# Patient Record
Sex: FEMALE | Race: BLACK OR AFRICAN AMERICAN | Employment: FULL TIME | URBAN - METROPOLITAN AREA
[De-identification: names, ages, dates, MRNs, and addresses within clinical notes are randomized per-mention and may not be internally consistent; named-entity substitution may affect disease eponyms.]

---

## 2017-03-27 PROCEDURE — 99284 EMERGENCY DEPT VISIT MOD MDM: CPT

## 2017-03-28 ENCOUNTER — HOSPITAL ENCOUNTER (EMERGENCY)
Age: 22
Discharge: HOME | End: 2017-03-28
Attending: EMERGENCY MEDICINE
Payer: MEDICAID

## 2017-03-28 VITALS
BODY MASS INDEX: 21.89 KG/M2 | HEIGHT: 65 IN | HEART RATE: 80 BPM | OXYGEN SATURATION: 100 % | TEMPERATURE: 98.6 F | WEIGHT: 131.39 LBS | RESPIRATION RATE: 16 BRPM | DIASTOLIC BLOOD PRESSURE: 61 MMHG | SYSTOLIC BLOOD PRESSURE: 106 MMHG

## 2017-03-28 DIAGNOSIS — R11.2 NAUSEA AND VOMITING, INTRACTABILITY OF VOMITING NOT SPECIFIED, UNSPECIFIED VOMITING TYPE: Primary | ICD-10-CM

## 2017-03-28 LAB
ALBUMIN SERPL BCP-MCNC: 3.2 G/DL (ref 3.5–5)
ALBUMIN/GLOB SERPL: 0.8 {RATIO} (ref 1.1–2.2)
ALP SERPL-CCNC: 102 U/L (ref 45–117)
ALT SERPL-CCNC: 49 U/L (ref 12–78)
ANION GAP BLD CALC-SCNC: 9 MMOL/L (ref 5–15)
APPEARANCE UR: CLEAR
AST SERPL W P-5'-P-CCNC: 33 U/L (ref 15–37)
BACTERIA URNS QL MICRO: ABNORMAL /HPF
BASOPHILS # BLD AUTO: 0 K/UL (ref 0–0.1)
BASOPHILS # BLD: 0 % (ref 0–1)
BILIRUB SERPL-MCNC: 0.5 MG/DL (ref 0.2–1)
BILIRUB UR QL: NEGATIVE
BUN SERPL-MCNC: 8 MG/DL (ref 6–20)
BUN/CREAT SERPL: 16 (ref 12–20)
CALCIUM SERPL-MCNC: 8.8 MG/DL (ref 8.5–10.1)
CHLORIDE SERPL-SCNC: 105 MMOL/L (ref 97–108)
CO2 SERPL-SCNC: 24 MMOL/L (ref 21–32)
COLOR UR: ABNORMAL
CREAT SERPL-MCNC: 0.49 MG/DL (ref 0.55–1.02)
EOSINOPHIL # BLD: 0.1 K/UL (ref 0–0.4)
EOSINOPHIL NFR BLD: 2 % (ref 0–7)
EPITH CASTS URNS QL MICRO: ABNORMAL /LPF
ERYTHROCYTE [DISTWIDTH] IN BLOOD BY AUTOMATED COUNT: 13 % (ref 11.5–14.5)
GLOBULIN SER CALC-MCNC: 4 G/DL (ref 2–4)
GLUCOSE SERPL-MCNC: 70 MG/DL (ref 65–100)
GLUCOSE UR STRIP.AUTO-MCNC: NEGATIVE MG/DL
HCT VFR BLD AUTO: 34.7 % (ref 35–47)
HGB BLD-MCNC: 11.6 G/DL (ref 11.5–16)
HGB UR QL STRIP: NEGATIVE
HYALINE CASTS URNS QL MICRO: ABNORMAL /LPF (ref 0–5)
KETONES UR QL STRIP.AUTO: NEGATIVE MG/DL
LEUKOCYTE ESTERASE UR QL STRIP.AUTO: ABNORMAL
LYMPHOCYTES # BLD AUTO: 30 % (ref 12–49)
LYMPHOCYTES # BLD: 2.2 K/UL (ref 0.8–3.5)
MCH RBC QN AUTO: 29.3 PG (ref 26–34)
MCHC RBC AUTO-ENTMCNC: 33.4 G/DL (ref 30–36.5)
MCV RBC AUTO: 87.6 FL (ref 80–99)
MONOCYTES # BLD: 0.5 K/UL (ref 0–1)
MONOCYTES NFR BLD AUTO: 7 % (ref 5–13)
NEUTS SEG # BLD: 4.5 K/UL (ref 1.8–8)
NEUTS SEG NFR BLD AUTO: 61 % (ref 32–75)
NITRITE UR QL STRIP.AUTO: NEGATIVE
PH UR STRIP: 6 [PH] (ref 5–8)
PLATELET # BLD AUTO: 112 K/UL (ref 150–400)
POTASSIUM SERPL-SCNC: 4 MMOL/L (ref 3.5–5.1)
PROT SERPL-MCNC: 7.2 G/DL (ref 6.4–8.2)
PROT UR STRIP-MCNC: NEGATIVE MG/DL
RBC # BLD AUTO: 3.96 M/UL (ref 3.8–5.2)
RBC #/AREA URNS HPF: ABNORMAL /HPF (ref 0–5)
SODIUM SERPL-SCNC: 138 MMOL/L (ref 136–145)
SP GR UR REFRACTOMETRY: 1.03 (ref 1–1.03)
UA: UC IF INDICATED,UAUC: ABNORMAL
UROBILINOGEN UR QL STRIP.AUTO: 1 EU/DL (ref 0.2–1)
WBC # BLD AUTO: 7.3 K/UL (ref 3.6–11)
WBC URNS QL MICRO: ABNORMAL /HPF (ref 0–4)

## 2017-03-28 PROCEDURE — 85025 COMPLETE CBC W/AUTO DIFF WBC: CPT | Performed by: EMERGENCY MEDICINE

## 2017-03-28 PROCEDURE — 80053 COMPREHEN METABOLIC PANEL: CPT | Performed by: EMERGENCY MEDICINE

## 2017-03-28 PROCEDURE — 81001 URINALYSIS AUTO W/SCOPE: CPT | Performed by: EMERGENCY MEDICINE

## 2017-03-28 PROCEDURE — 87086 URINE CULTURE/COLONY COUNT: CPT | Performed by: EMERGENCY MEDICINE

## 2017-03-28 PROCEDURE — 36415 COLL VENOUS BLD VENIPUNCTURE: CPT | Performed by: EMERGENCY MEDICINE

## 2017-03-28 RX ORDER — SODIUM CHLORIDE 0.9 % (FLUSH) 0.9 %
5-10 SYRINGE (ML) INJECTION EVERY 8 HOURS
Status: DISCONTINUED | OUTPATIENT
Start: 2017-03-28 | End: 2017-03-28 | Stop reason: HOSPADM

## 2017-03-28 RX ORDER — ONDANSETRON 4 MG/1
4 TABLET, ORALLY DISINTEGRATING ORAL
Qty: 10 TAB | Refills: 0 | Status: SHIPPED | OUTPATIENT
Start: 2017-03-28 | End: 2018-10-12

## 2017-03-28 RX ORDER — SODIUM CHLORIDE 0.9 % (FLUSH) 0.9 %
5-10 SYRINGE (ML) INJECTION AS NEEDED
Status: DISCONTINUED | OUTPATIENT
Start: 2017-03-28 | End: 2017-03-28 | Stop reason: HOSPADM

## 2017-03-28 RX ORDER — ONDANSETRON 2 MG/ML
4 INJECTION INTRAMUSCULAR; INTRAVENOUS
Status: DISCONTINUED | OUTPATIENT
Start: 2017-03-28 | End: 2017-03-28 | Stop reason: HOSPADM

## 2017-03-28 NOTE — ED NOTES
Assumed care of pt from triage at this time. Pt arrives with c/o N/V/D x earlier yesterday am.  Pt states she is 23 weeks pregnant and initially attributed the N/V to motion sickness, however when the diarrhea began she thought it could be something else. Pt denies any fevers, body aches, chills. Pt denies any abnormal vaginal bleeding, blood in urine or stool. Pt resting comfortably on the stretcher in a position of comfort.  Pt in no acute distress at this time.  Call bell within reach.  Side rails x 2.  Cardiac monitor x 2.  Stretcher locked in the lowest position.  Pt aware of plan to await for MD/PA-C/NP assessment, and pt/family verbalizes understanding.  Will continue to monitor N/V/D.    Suellen Rinne

## 2017-03-28 NOTE — DISCHARGE INSTRUCTIONS
Managing Morning Sickness: Care Instructions  Your Care Instructions  For many women, the toughest part of early pregnancy is morning sickness. Morning sickness can range from mild nausea to severe nausea with bouts of vomiting. Symptoms may be worse in the morning, although they can strike at any time of the day or night. If you have nausea, vomiting, or both, look for safe measures that can bring you relief. You can take simple steps at home to manage morning sickness. These steps include changing what and when you eat and avoiding certain foods and smells. Some women find that acupuncture and acupressure wristbands also help. Follow-up care is a key part of your treatment and safety. Be sure to make and go to all appointments, and call your doctor if you are having problems. It's also a good idea to know your test results and keep a list of the medicines you take. How can you care for yourself at home? · Keep food in your stomach, but not too much at once. Your nausea may be worse if your stomach is empty. Eat five or six small meals a day instead of three large meals. · For morning nausea, eat a small snack, such as a couple of crackers or dry biscuits, before rising. Allow a few minutes for your stomach to settle before you get out of bed slowly. · Drink plenty of fluids, enough so that your urine is light yellow or clear like water. If you have kidney, heart, or liver disease and have to limit fluids, talk with your doctor before you increase the amount of fluids you drink. Some women find that peppermint tea helps with nausea. · Eat more protein, such as chicken, fish, lean meat, beans, nuts, and seeds. · Eat carbohydrate foods, such as potatoes, whole-grain cereals, rice, and pasta. · Avoid smells and foods that make you feel nauseated. Spicy or high-fat foods, citrus juice, milk, coffee, and tea with caffeine often make nausea worse. · Do not drink alcohol. · Do not smoke.  Try not to be around others who smoke. If you need help quitting, talk to your doctor about stop-smoking programs and medicines. These can increase your chances of quitting for good. · If you are taking iron supplements, ask your doctor if they are necessary. Iron can make nausea worse. · Get lots of rest. Stress and fatigue can make your morning sickness worse. · Ask your doctor about taking prescription medicine, or over-the-counter products such as vitamin B6, doxylamine, or raymundo, to relieve your symptoms. Your doctor can tell you the doses that are safe for you. · Take your prenatal vitamins at night on a full stomach. When should you call for help? Call your doctor now or seek immediate medical care if:  · You are too sick to your stomach to drink any fluids. · You have symptoms of dehydration, such as:  ¨ Dry eyes and a dry mouth. ¨ Passing only a little dark urine. ¨ Feeling thirstier than usual.  · You have new symptoms such as diarrhea, fever, or belly pain. Watch closely for changes in your health, and be sure to contact your doctor if:  · You lose weight. · You have ongoing nausea and vomiting. Where can you learn more? Go to http://cheryle-azael.info/. Enter L410 in the search box to learn more about \"Managing Morning Sickness: Care Instructions. \"  Current as of: June 8, 2016  Content Version: 11.1  © 7877-9754 [employer]. Care instructions adapted under license by [employer] (which disclaims liability or warranty for this information). If you have questions about a medical condition or this instruction, always ask your healthcare professional. [place] [address] any warranty or liability for your use of this information.

## 2017-03-28 NOTE — ED PROVIDER NOTES
HPI Comments: Jesse Cintron is a 24 y.o. female, G1 ~23 weeks gravid, who presents ambulatory to the ED c/o multiple episodes of nausea, vomiting (x10), and diarrhea (x3) with associated diffuse abd cramping x this morning. Pt denies any complications with her current pregnancy. She notes her mother is currently sick with similar sxs. Pt specifically denies any recent young abd pain, hematemesis, hematochezia, urinary sxs, vaginal bleeding, vaginal pain, vaginal discharge, CP, SOB, fever, or chills. PCP: Isaura Díaz MD  OB: Florence Galvan MD    Allergies: NKDA  PMHx: Significant for inguinal / umbilical hernia  PSHx: Significant for hernia repair  Social Hx: -tobacco, -EtOH, -Illicit Drugs    There are no other complaints, changes, or physical findings at this time. The history is provided by the patient. Past Medical History:   Diagnosis Date    HX OTHER MEDICAL     inguinal and umbilical hernia repair       History reviewed. No pertinent surgical history. History reviewed. No pertinent family history. Social History     Social History    Marital status: SINGLE     Spouse name: N/A    Number of children: N/A    Years of education: N/A     Occupational History    Not on file. Social History Main Topics    Smoking status: Never Smoker    Smokeless tobacco: Never Used    Alcohol use No    Drug use: No    Sexual activity: Not on file     Other Topics Concern    Not on file     Social History Narrative         ALLERGIES: Review of patient's allergies indicates no known allergies. Review of Systems   Constitutional: Negative. Negative for chills and fever. Eyes: Negative. Respiratory: Negative. Negative for shortness of breath. Cardiovascular: Negative for chest pain. Gastrointestinal: Positive for abdominal pain, diarrhea, nausea and vomiting. Endocrine: Negative. Genitourinary: Negative.   Negative for difficulty urinating, dysuria, frequency, hematuria, vaginal bleeding, vaginal discharge and vaginal pain. Musculoskeletal: Negative. Skin: Negative. Allergic/Immunologic: Negative. Neurological: Negative. Psychiatric/Behavioral: Negative for suicidal ideas. All other systems reviewed and are negative. Vitals:    03/28/17 0130 03/28/17 0230 03/28/17 0245 03/28/17 0300   BP: 105/62 109/62 107/58 106/61   Pulse:       Resp:       Temp:       SpO2: 100% 100% 100% 100%   Weight:       Height:                Physical Exam   Nursing note and vitals reviewed.   General appearance - well nourished, well appearing, and in no distress  Eyes - pupils equal and reactive, extraocular eye movements intact  ENT - mucous membranes moist, pharynx normal without lesions  Neck - supple, no significant adenopathy; non-tender to palpation  Chest - clear to auscultation, no wheezes, rales or rhonchi; non-tender to palpation  Heart - normal rate and regular rhythm, S1 and S2 normal, no murmurs noted  Abdomen - soft, nontender, gravid uterus with fundus palpable at the umbilicus, no masses or organomegaly  Musculoskeletal - no joint tenderness, deformity or swelling; normal ROM  Extremities - peripheral pulses normal, no pedal edema  Skin - normal coloration and turgor, no rashes  Neurological - alert, oriented x3, normal speech, no focal findings or movement disorder noted  Written by ROB Basilio, as dictated by Segundo Morris MD    MDM  Number of Diagnoses or Management Options  Nausea and vomiting, intractability of vomiting not specified, unspecified vomiting type:   Diagnosis management comments:   DDx: hyperemesis gravidarum, dehydration, electrolyte abnormality, UTI       Amount and/or Complexity of Data Reviewed  Clinical lab tests: reviewed and ordered  Review and summarize past medical records: yes    Patient Progress  Patient progress: stable        Procedures    2:42 AM  Progress note:  Pt noted to be feeling better, ready for discharge. Pt passed PO challenge without difficulty; one episode of emesis following antiemetics, nausea resolved at this time. Discussed lab findings with pt and/or family. Will follow up as instructed. All questions have been answered, pt voiced understanding and agreement with plan. Specific return precautions provided as well as instructions to return to the ED should sx worsen at any time. Written by Marilu Neely ED Scribe, as dictated by Nan Feldman MD    LABORATORY TESTS:  Recent Results (from the past 12 hour(s))   CBC WITH AUTOMATED DIFF    Collection Time: 03/28/17 12:39 AM   Result Value Ref Range    WBC 7.3 3.6 - 11.0 K/uL    RBC 3.96 3.80 - 5.20 M/uL    HGB 11.6 11.5 - 16.0 g/dL    HCT 34.7 (L) 35.0 - 47.0 %    MCV 87.6 80.0 - 99.0 FL    MCH 29.3 26.0 - 34.0 PG    MCHC 33.4 30.0 - 36.5 g/dL    RDW 13.0 11.5 - 14.5 %    PLATELET 715 (L) 000 - 400 K/uL    NEUTROPHILS 61 32 - 75 %    LYMPHOCYTES 30 12 - 49 %    MONOCYTES 7 5 - 13 %    EOSINOPHILS 2 0 - 7 %    BASOPHILS 0 0 - 1 %    ABS. NEUTROPHILS 4.5 1.8 - 8.0 K/UL    ABS. LYMPHOCYTES 2.2 0.8 - 3.5 K/UL    ABS. MONOCYTES 0.5 0.0 - 1.0 K/UL    ABS. EOSINOPHILS 0.1 0.0 - 0.4 K/UL    ABS. BASOPHILS 0.0 0.0 - 0.1 K/UL   METABOLIC PANEL, COMPREHENSIVE    Collection Time: 03/28/17 12:39 AM   Result Value Ref Range    Sodium 138 136 - 145 mmol/L    Potassium 4.0 3.5 - 5.1 mmol/L    Chloride 105 97 - 108 mmol/L    CO2 24 21 - 32 mmol/L    Anion gap 9 5 - 15 mmol/L    Glucose 70 65 - 100 mg/dL    BUN 8 6 - 20 MG/DL    Creatinine 0.49 (L) 0.55 - 1.02 MG/DL    BUN/Creatinine ratio 16 12 - 20      GFR est AA >60 >60 ml/min/1.73m2    GFR est non-AA >60 >60 ml/min/1.73m2    Calcium 8.8 8.5 - 10.1 MG/DL    Bilirubin, total 0.5 0.2 - 1.0 MG/DL    ALT (SGPT) 49 12 - 78 U/L    AST (SGOT) 33 15 - 37 U/L    Alk.  phosphatase 102 45 - 117 U/L    Protein, total 7.2 6.4 - 8.2 g/dL    Albumin 3.2 (L) 3.5 - 5.0 g/dL    Globulin 4.0 2.0 - 4.0 g/dL    A-G Ratio 0.8 (L) 1.1 - 2.2     URINALYSIS W/ REFLEX CULTURE    Collection Time: 03/28/17 12:39 AM   Result Value Ref Range    Color DARK YELLOW      Appearance CLEAR CLEAR      Specific gravity 1.027 1.003 - 1.030      pH (UA) 6.0 5.0 - 8.0      Protein NEGATIVE  NEG mg/dL    Glucose NEGATIVE  NEG mg/dL    Ketone NEGATIVE  NEG mg/dL    Bilirubin NEGATIVE  NEG      Blood NEGATIVE  NEG      Urobilinogen 1.0 0.2 - 1.0 EU/dL    Nitrites NEGATIVE  NEG      Leukocyte Esterase MODERATE (A) NEG      WBC 5-10 0 - 4 /hpf    RBC 0-5 0 - 5 /hpf    Epithelial cells MODERATE (A) FEW /lpf    Bacteria 1+ (A) NEG /hpf    UA:UC IF INDICATED URINE CULTURE ORDERED (A) CNI      Hyaline cast 2-5 0 - 5 /lpf       MEDICATIONS GIVEN:  Medications   sodium chloride (NS) flush 5-10 mL (not administered)   sodium chloride (NS) flush 5-10 mL (not administered)   sodium chloride 0.9 % bolus infusion 1,000 mL (not administered)   ondansetron (ZOFRAN) injection 4 mg (not administered)       IMPRESSION:  1. Nausea and vomiting, intractability of vomiting not specified, unspecified vomiting type        PLAN:  1. There are no discharge medications for this patient. 2.   Follow-up Information     Follow up With Details Comments [address] Lazaro Madrigal MD In 2 days  [address] [address]  [phone]      [facility] EMERGENCY DEPT  If symptoms worsen [address] [place] [address]  [phone]        Return to ED if worse     DISCHARGE NOTE:  2:52 AM  The patient's results have been reviewed with family and/or caregiver. They verbally convey their understanding and agreement of the patient's signs, symptoms, diagnosis, treatment, and prognosis. They additionally agree to follow up as recommended in the discharge instructions or to return to the Emergency Room should the patient's condition change prior to their follow-up appointment.  The family and/or caregiver verbally agrees with the care-plan and all of their questions have been answered. The discharge instructions have also been provided to the them along with educational information regarding the patient's diagnosis and a list of reasons why the patient would want to return to the ER prior to their follow-up appointment should their condition change. Written by Edna Estrada ED Scribe, as dictated by Mario House MD.          This note is prepared by Edna Estrada, acting as Scribe for MD Nan Howard MD : The scribe's documentation has been prepared under my direction and personally reviewed by me in its entirety. I confirm that the note above accurately reflects all work, treatment, procedures, and medical decision making performed by me. This note will not be viewable in [address]

## 2017-03-28 NOTE — ED NOTES
Pt reports relief of nausea after medication at this time. Pt resting comfortably with no further needs expressed.

## 2017-03-28 NOTE — LETTER
[address] 
[facility] EMERGENCY DEPT 
[address] [place] [address] [address] [id number] [phone] Work/School Note Date: 3/27/2017 To Whom It May concern: 
 
Aleksey Borges was seen and treated today in the emergency room by the following provider(s): 
Attending Provider: Adán Loco MD. Aleksey Borges may return to school on 3/29/2017. Sincerely, Rolan Leslie RN

## 2017-03-28 NOTE — ED NOTES
Discharge instructions given to patient by Dr. Bean Jerome. Verbalized understanding of instructions. Patient discharged without difficulty. Patient discharged in stable condition via ambulation, pt declined wheelchair, accompanied by self.

## 2017-03-29 LAB
BACTERIA SPEC CULT: NORMAL
CC UR VC: NORMAL
SERVICE CMNT-IMP: NORMAL

## 2018-10-12 ENCOUNTER — OFFICE VISIT (OUTPATIENT)
Dept: FAMILY MEDICINE CLINIC | Age: 23
End: 2018-10-12

## 2018-10-12 VITALS
DIASTOLIC BLOOD PRESSURE: 70 MMHG | HEART RATE: 71 BPM | RESPIRATION RATE: 14 BRPM | TEMPERATURE: 98.5 F | SYSTOLIC BLOOD PRESSURE: 108 MMHG | BODY MASS INDEX: 19.46 KG/M2 | HEIGHT: 64 IN | OXYGEN SATURATION: 100 % | WEIGHT: 114 LBS

## 2018-10-12 DIAGNOSIS — H53.9 VISION CHANGES: Primary | ICD-10-CM

## 2018-10-12 LAB — HBA1C MFR BLD HPLC: 5 %

## 2018-10-12 RX ORDER — MEDROXYPROGESTERONE ACETATE 150 MG/ML
INJECTION, SUSPENSION INTRAMUSCULAR
COMMUNITY
Start: 2018-09-28

## 2018-10-12 NOTE — PROGRESS NOTES
. 
Chief Complaint Patient presents with  Blood sugar problem  
  change in vision concern prompted her to feel she may have a sugar problem 1. Have you been to the ER, urgent care clinic since your last visit? Hospitalized since your last visit? no 
 
2. Have you seen or consulted any other health care providers outside of the [address] [place] [address] since your last visit? Include any pap smears or colon screening. no 
 
Rosa M Park

## 2018-10-12 NOTE — PROGRESS NOTES
HPI 
Kelly Lovell is a 21 y.o. female who presents with a specific concern. She is a new patient. She went to her eye doctor for a routine checkup and had a significant change in the prescription for her right eye. It was a change for the better which concerned the optometrist for a possible change in eye shape i.e. due to diabetes. Patient has been concerned about this, and would like to get checked. She had a pregnancy in 8094 that was uncomplicated. No gestational diabetes, no hypertension She is getting regular follow-up with her gynecologist.  Annie Hunting Depo-Provera. This does not correlate with the vision change. She last saw her pediatrician age 25, Dr. Anay Ayers. We will recheck out for records and immunization PMHx: 
Past Medical History:  
Diagnosis Date  Anemia  HX OTHER MEDICAL   
 inguinal and umbilical hernia repair Meds:  
Current Outpatient Prescriptions Medication Sig Dispense Refill  medroxyPROGESTERone (DEPO-PROVERA) 150 mg/mL syrg  ondansetron (ZOFRAN ODT) 4 mg disintegrating tablet Take 1 Tab by mouth every eight (8) hours as needed for Nausea. 10 Tab 0 Allergies:  
No Known Allergies Smoker: 
History Smoking Status  Never Smoker Smokeless Tobacco  
 Never Used ETOH:  
History Alcohol Use No  
 
 
FH:  
Family History Problem Relation Age of Onset  Diabetes Maternal Grandmother ROS:  
As listed in HPI. In addition: 
Constitutional:   No headache, fever, fatigue, weight loss or weight gain Cardiac:    No chest pain Resp:   No cough or shortness of breath Neuro   No loss of consciousness, dizziness, seizures Physical Exam: 
Blood pressure 108/70, pulse 71, temperature 98.5 °F (36.9 °C), resp. rate 14, height 5' 4\" (1.626 m), weight 114 lb (51.7 kg), last menstrual period 09/28/2018, SpO2 100 %, unknown if currently breastfeeding. GEN: No apparent distress. Alert and oriented and responds to all questions appropriately. NEUROLOGIC:  No focal neurologic deficits. Strength and sensation grossly intact. Coordination and gait grossly intact. EXT: Well perfused. No edema. SKIN: No obvious rashes. Assessment and Plan Concerned about vision change Her vision has improved in her right eye raising the concern of a change in eyes shape which brought her here to discuss the possibility diabetes A1c is 5.0.  Reassured patient. Consider technique problem in visual acuity assessment the last time she had a checkup. She plans to get her prescription changed with her eye doctor. Follow-up regularly with your gynecologist 
 
She is not interested in other blood work today She is not interested in the flu shot today ICD-10-CM ICD-9-CM 1. Vision changes H53.9 368.9 AMB POC HEMOGLOBIN A1C AVS given. Pt expressed understanding of instructions

## 2024-06-09 ENCOUNTER — HOSPITAL ENCOUNTER (EMERGENCY)
Facility: HOSPITAL | Age: 29
Discharge: HOME | End: 2024-06-09
Payer: COMMERCIAL

## 2024-06-09 VITALS
DIASTOLIC BLOOD PRESSURE: 66 MMHG | TEMPERATURE: 98.2 F | SYSTOLIC BLOOD PRESSURE: 131 MMHG | HEART RATE: 79 BPM | OXYGEN SATURATION: 98 % | RESPIRATION RATE: 18 BRPM

## 2024-06-09 DIAGNOSIS — S81.812A LACERATION OF LEFT LOWER EXTREMITY, INITIAL ENCOUNTER: Primary | ICD-10-CM

## 2024-06-09 PROCEDURE — 6360000002 HC RX W HCPCS: Performed by: PHYSICIAN ASSISTANT

## 2024-06-09 PROCEDURE — 99284 EMERGENCY DEPT VISIT MOD MDM: CPT

## 2024-06-09 PROCEDURE — 90715 TDAP VACCINE 7 YRS/> IM: CPT | Performed by: PHYSICIAN ASSISTANT

## 2024-06-09 PROCEDURE — 90471 IMMUNIZATION ADMIN: CPT | Performed by: PHYSICIAN ASSISTANT

## 2024-06-09 RX ORDER — CEPHALEXIN 500 MG/1
500 CAPSULE ORAL 3 TIMES DAILY
Qty: 21 CAPSULE | Refills: 0 | Status: SHIPPED | OUTPATIENT
Start: 2024-06-09 | End: 2024-06-16

## 2024-06-09 RX ADMIN — TETANUS TOXOID, REDUCED DIPHTHERIA TOXOID AND ACELLULAR PERTUSSIS VACCINE, ADSORBED 0.5 ML: 5; 2.5; 8; 8; 2.5 SUSPENSION INTRAMUSCULAR at 18:01

## 2024-06-09 NOTE — ED PROVIDER NOTES
[facility] EMERGENCY DEPT  EMERGENCY DEPARTMENT ENCOUNTER       Pt Name: Torres Perales  MRN: [id number]  Birthdate 1995  Date of evaluation: 6/9/2024  Provider: SANDI Cottrell   PCP: Lilian Best MD  Note Started: 5:56 PM EDT 6/9/24     CHIEF COMPLAINT       Chief Complaint   Patient presents with    Laceration     Lac to LLE, needs tetanus         HISTORY OF PRESENT ILLNESS: 1 or more elements      History From: Patient  None     Torres Perales is a 28 y.o. female who presents to the ED for evaluation of a laceration that occurred ~3am last night. She was in [place] when this happened, but [place] get to the airport. [place]. Bleeding controlled. Unsure tetanus status.  Endorses mild localized soreness, but denies pain.  Denies numbness, weakness, or tingling.  Denies difficulty ambulating.     Nursing Notes were all reviewed and agreed with or any disagreements were addressed in the HPI.     REVIEW OF SYSTEMS      Review of Systems   All other systems reviewed and are negative.       Positives and Pertinent negatives as per HPI.    PAST HISTORY     Past Medical History:  No past medical history on file.    Past Surgical History:  No past surgical history on file.    Family History:  No family history on file.    Social History:       Allergies:  No Known Allergies        PHYSICAL EXAM      ED Triage Vitals [06/09/24 1724]   Enc Vitals Group      /66      Pulse 79      Respirations 18      Temp 98.2 °F (36.8 °C)      Temp Source Oral      SpO2 98 %      Weight       Height       Head Circumference       Peak Flow       Pain Score       Pain Loc       Pain Edu?       Excl. in GC?         Physical Exam  Constitutional:       Appearance: Normal appearance.   Cardiovascular:      Rate and Rhythm: Normal rate.   Pulmonary:      Effort: Pulmonary effort is normal.   Abdominal:      General: There is no distension.   Musculoskeletal:         General: Normal range of

## 2024-06-09 NOTE — ED NOTES
Pt discharged by SANDI Escalante. Pt verbalized understanding of follow up appointments, Rx instructions, and results of care. No further questions or concerns at this time. Pt out of ED ambulatory accompanied by self. Work note provided.

## 2024-06-09 NOTE — DISCHARGE INSTRUCTIONS
Antibiotics as we discussed   Wound care as discussed  Return precautions as discussed       Thank You!    It was a pleasure taking care of you in our Emergency Department today. We know that when you come to [facility], you are entrusting us with your health, comfort, and safety. Our clinicians honor that trust, and truly appreciate the opportunity to care for you and your loved ones.      We also value your feedback. If you receive a survey about your Emergency Department experience today, please fill it out.  We care about our patients' feedback, and we listen to what you have to say. Thank you.    Ashley Escalante PA-C